# Patient Record
Sex: FEMALE | Race: OTHER | Employment: FULL TIME | URBAN - METROPOLITAN AREA
[De-identification: names, ages, dates, MRNs, and addresses within clinical notes are randomized per-mention and may not be internally consistent; named-entity substitution may affect disease eponyms.]

---

## 2017-11-22 ENCOUNTER — APPOINTMENT (OUTPATIENT)
Dept: GENERAL RADIOLOGY | Facility: HOSPITAL | Age: 22
End: 2017-11-22
Attending: PHYSICIAN ASSISTANT
Payer: OTHER MISCELLANEOUS

## 2017-11-22 ENCOUNTER — HOSPITAL ENCOUNTER (EMERGENCY)
Facility: HOSPITAL | Age: 22
Discharge: HOME | End: 2017-11-22
Attending: EMERGENCY MEDICINE
Payer: OTHER MISCELLANEOUS

## 2017-11-22 VITALS
HEART RATE: 60 BPM | BODY MASS INDEX: 24.84 KG/M2 | DIASTOLIC BLOOD PRESSURE: 55 MMHG | WEIGHT: 135 LBS | HEIGHT: 62 IN | TEMPERATURE: 98 F | OXYGEN SATURATION: 100 % | RESPIRATION RATE: 16 BRPM | SYSTOLIC BLOOD PRESSURE: 106 MMHG

## 2017-11-22 DIAGNOSIS — S80.12XA CONTUSION OF LEFT LOWER EXTREMITY, INITIAL ENCOUNTER: Primary | ICD-10-CM

## 2017-11-22 PROCEDURE — 99283 EMERGENCY DEPT VISIT LOW MDM: CPT

## 2017-11-22 PROCEDURE — 73502 X-RAY EXAM HIP UNI 2-3 VIEWS: CPT | Performed by: PHYSICIAN ASSISTANT

## 2017-11-22 PROCEDURE — 73552 X-RAY EXAM OF FEMUR 2/>: CPT | Performed by: PHYSICIAN ASSISTANT

## 2017-11-22 RX ORDER — NAPROXEN 500 MG/1
500 TABLET ORAL 2 TIMES DAILY PRN
Qty: 30 TABLET | Refills: 0 | Status: SHIPPED | OUTPATIENT
Start: 2017-11-22 | End: 2017-11-29

## 2017-11-22 RX ORDER — IBUPROFEN 600 MG/1
600 TABLET ORAL ONCE
Status: COMPLETED | OUTPATIENT
Start: 2017-11-22 | End: 2017-11-22

## 2017-11-22 NOTE — ED INITIAL ASSESSMENT (HPI)
Patient states she missed the last step off ladder while at work for [address] falling on to left side C/o pain along entire left leg but ambulates easily.

## 2017-11-23 NOTE — ED NOTES
Spoke with Ariella Rojas, supervisor for [employer], no drug or ETOH testing requested for patient

## 2017-11-23 NOTE — ED PROVIDER NOTES
Patient Seen in: [facility] Emergency Department    History   Patient presents with:   Worker's Comp  Back Pain (musculoskeletal)  Lower Extremity Injury (musculoskeletal)    Stated Complaint: work inj back and foot pain    WILFRIDO Doe is a 20yo Left hip: She exhibits tenderness and bony tenderness. She exhibits normal range of motion, normal strength, no swelling, no crepitus, no deformity and no laceration.         Left knee: Normal.        Left ankle: Normal.        Lumbar back: Normal. transcribed by Technologist)  Patient fell off of the last step of a ladder and fell onto her left side. Pain near left hip and buttocks. FINDINGS:  BONES:  Normal.  No significant arthropathy or acute abnormality. SOFT TISSUES:  Negative.   No visible s following up with her doctor- None Pcp  - as instructed. The patient verbalized understanding of the discharge instructions and plan.

## 2017-11-23 NOTE — ED PROVIDER NOTES
Patient Seen in: [facility] Emergency Department    History   Patient presents with:   Worker's Comp  Back Pain (musculoskeletal)  Lower Extremity Injury (musculoskeletal)    Stated Complaint: work inj back and foot pain    HPI        History reviewe

## 2017-11-23 NOTE — ED NOTES
Left message for Syd, patient's supervisor to see if patient needs testing per [employer] protocol

## 2017-11-27 ENCOUNTER — APPOINTMENT (OUTPATIENT)
Dept: OTHER | Facility: HOSPITAL | Age: 22
End: 2017-11-27
Attending: PREVENTIVE MEDICINE
Payer: OTHER MISCELLANEOUS

## 2017-12-01 ENCOUNTER — APPOINTMENT (OUTPATIENT)
Dept: OTHER | Facility: HOSPITAL | Age: 22
End: 2017-12-01
Attending: PREVENTIVE MEDICINE
Payer: OTHER MISCELLANEOUS

## 2017-12-08 ENCOUNTER — APPOINTMENT (OUTPATIENT)
Dept: OTHER | Facility: HOSPITAL | Age: 22
End: 2017-12-08
Attending: PREVENTIVE MEDICINE
Payer: OTHER MISCELLANEOUS

## 2017-12-13 PROBLEM — S76.012A MUSCLE STRAIN OF LEFT GLUTEAL REGION: Status: ACTIVE | Noted: 2017-12-13

## 2021-05-19 ENCOUNTER — TELEPHONE (OUTPATIENT)
Dept: OBGYN UNIT | Facility: HOSPITAL | Age: 26
End: 2021-05-19

## (undated) NOTE — ED AVS SNAPSHOT
Jayson Parker   MRN: [id number]    Department:  [facility] Emergency Department   Date of Visit:  11/22/2017           Disclosure     Insurance plans vary and the physician(s) referred by the ER may not be covered by your plan.  Please contact your tell this physician (or your personal doctor if your instructions are to return to your personal doctor) about any new or lasting problems. The primary care or specialist physician will see patients referred from the [facility] Emergency Department.  Roxane Sneed

## (undated) NOTE — LETTER
November 22, 2017    Patient: Suzi Graves   Date of Visit: 11/22/2017       To Whom It May Concern:    Suzi Graves was seen and treated in our emergency department on 11/22/2017. She should not return to work until 11/25/17.     If you have any questi